# Patient Record
Sex: MALE | Race: WHITE | NOT HISPANIC OR LATINO | Employment: UNEMPLOYED | URBAN - METROPOLITAN AREA
[De-identification: names, ages, dates, MRNs, and addresses within clinical notes are randomized per-mention and may not be internally consistent; named-entity substitution may affect disease eponyms.]

---

## 2023-11-28 ENCOUNTER — NURSING HOME VISIT (OUTPATIENT)
Dept: POST ACUTE CARE | Facility: EXTERNAL LOCATION | Age: 53
End: 2023-11-28
Payer: MEDICAID

## 2023-11-28 DIAGNOSIS — R13.10 DYSPHAGIA, UNSPECIFIED TYPE: ICD-10-CM

## 2023-11-28 DIAGNOSIS — I11.9 HYPERTENSIVE CARDIOMEGALY: ICD-10-CM

## 2023-11-28 DIAGNOSIS — R53.1 WEAKNESS: Primary | ICD-10-CM

## 2023-11-28 DIAGNOSIS — Z79.899 ON DEEP VEIN THROMBOSIS (DVT) PROPHYLAXIS: ICD-10-CM

## 2023-11-28 DIAGNOSIS — F25.9 SCHIZOAFFECTIVE DISORDER, UNSPECIFIED TYPE (MULTI): ICD-10-CM

## 2023-11-28 PROBLEM — F20.9 SCHIZOPHRENIA (MULTI): Status: ACTIVE | Noted: 2023-11-28

## 2023-11-28 PROBLEM — F20.9 SCHIZOPHRENIA (MULTI): Status: RESOLVED | Noted: 2023-11-28 | Resolved: 2023-11-28

## 2023-11-28 PROCEDURE — 99309 SBSQ NF CARE MODERATE MDM 30: CPT | Performed by: NURSE PRACTITIONER

## 2023-11-28 NOTE — LETTER
Patient: Ian Robison  : 1970    Encounter Date: 2023    PROGRESS NOTE    Subjective  Chief complaint: Ian Robison is a 53 y.o. male who is an acute skilled patient being seen and evaluated for weakness    HPI: recently admitted to this facility for rehabilitation following hospitalization for pneumonia. Is actively responding. Poor historian. Asking for his mother top be called to bring him cigarettes.   Requires mechanical lift and is totally dependent on staff for all bed mobility.   HPI      Objective  Vital signs: 108/71-78-98.3-18-95%     Physical Exam  Constitutional:       General: He is not in acute distress.  Eyes:      Extraocular Movements: Extraocular movements intact.   Cardiovascular:      Rate and Rhythm: Normal rate and regular rhythm.   Pulmonary:      Effort: Pulmonary effort is normal.      Breath sounds: Normal breath sounds.      Comments: Lungs clear   Abdominal:      General: Bowel sounds are normal.      Palpations: Abdomen is soft.      Comments: Soft, distended  BS x 4  Has scar from healed previous  peg tube site    Musculoskeletal:      Cervical back: Neck supple.      Right lower leg: No edema.      Left lower leg: No edema.   Neurological:      Mental Status: He is alert.   Psychiatric:         Mood and Affect: Mood normal.         Behavior: Behavior is cooperative.         Assessment/Plan  Problem List Items Addressed This Visit       Weakness - Primary     Requires rehabilitation   Currently requires mechanical lift   Monitor progress         Dysphagia     Requires modified pureed diet   hX peg tube feeding   ST to follow          RESOLVED: Schizoaffective disorder (CMS/HCC)     Chronic history  Had required mental health admission   Poor historian    Remains on haldol , depakene and cogentin   Monitor mood and behaviors    Psych to follow          On deep vein thrombosis (DVT) prophylaxis     Limited mobility   requires lovenox   Monitor symptoms          Hypertensive cardiomegaly     Monitor CV status   Monitor BP    Remains on propranolol           Medications, treatments, and labs reviewed  Continue medications and treatments as listed in EMR    ANUP Jay      Electronically Signed By: ANUP Jay   11/28/23  7:13 PM

## 2023-11-28 NOTE — PROGRESS NOTES
PROGRESS NOTE    Subjective   Chief complaint: Ian Robison is a 53 y.o. male who is an acute skilled patient being seen and evaluated for weakness    HPI: recently admitted to this facility for rehabilitation following hospitalization for pneumonia. Is actively responding. Poor historian. Asking for his mother top be called to bring him cigarettes.   Requires mechanical lift and is totally dependent on staff for all bed mobility.   HPI      Objective   Vital signs: 108/71-78-98.3-18-95%     Physical Exam  Constitutional:       General: He is not in acute distress.  Eyes:      Extraocular Movements: Extraocular movements intact.   Cardiovascular:      Rate and Rhythm: Normal rate and regular rhythm.   Pulmonary:      Effort: Pulmonary effort is normal.      Breath sounds: Normal breath sounds.      Comments: Lungs clear   Abdominal:      General: Bowel sounds are normal.      Palpations: Abdomen is soft.      Comments: Soft, distended  BS x 4  Has scar from healed previous  peg tube site    Musculoskeletal:      Cervical back: Neck supple.      Right lower leg: No edema.      Left lower leg: No edema.   Neurological:      Mental Status: He is alert.   Psychiatric:         Mood and Affect: Mood normal.         Behavior: Behavior is cooperative.         Assessment/Plan   Problem List Items Addressed This Visit       Weakness - Primary     Requires rehabilitation   Currently requires mechanical lift   Monitor progress         Dysphagia     Requires modified pureed diet   hX peg tube feeding   ST to follow          RESOLVED: Schizoaffective disorder (CMS/HCC)     Chronic history  Had required mental health admission   Poor historian    Remains on haldol , depakene and cogentin   Monitor mood and behaviors    Psych to follow          On deep vein thrombosis (DVT) prophylaxis     Limited mobility   requires lovenox   Monitor symptoms         Hypertensive cardiomegaly     Monitor CV status   Monitor BP    Remains on  propranolol           Medications, treatments, and labs reviewed  Continue medications and treatments as listed in EMR    Bruna Hanson, APRN-CNP

## 2023-12-01 ENCOUNTER — LAB REQUISITION (OUTPATIENT)
Dept: LAB | Facility: HOSPITAL | Age: 53
End: 2023-12-01
Payer: MEDICAID

## 2023-12-01 LAB
APPEARANCE UR: ABNORMAL
BILIRUB UR STRIP.AUTO-MCNC: NEGATIVE MG/DL
COLOR UR: YELLOW
GLUCOSE UR STRIP.AUTO-MCNC: NEGATIVE MG/DL
HOLD SPECIMEN: NORMAL
KETONES UR STRIP.AUTO-MCNC: ABNORMAL MG/DL
LEUKOCYTE ESTERASE UR QL STRIP.AUTO: NEGATIVE
NITRITE UR QL STRIP.AUTO: NEGATIVE
PH UR STRIP.AUTO: 6 [PH]
PROT UR STRIP.AUTO-MCNC: NEGATIVE MG/DL
RBC # UR STRIP.AUTO: NEGATIVE /UL
SP GR UR STRIP.AUTO: 1.01
UROBILINOGEN UR STRIP.AUTO-MCNC: 2 MG/DL

## 2023-12-01 PROCEDURE — 81003 URINALYSIS AUTO W/O SCOPE: CPT

## 2023-12-04 ENCOUNTER — NURSING HOME VISIT (OUTPATIENT)
Dept: POST ACUTE CARE | Facility: EXTERNAL LOCATION | Age: 53
End: 2023-12-04
Payer: MEDICAID

## 2023-12-04 DIAGNOSIS — K59.00 CONSTIPATION, UNSPECIFIED CONSTIPATION TYPE: ICD-10-CM

## 2023-12-04 DIAGNOSIS — F25.9 SCHIZOAFFECTIVE DISORDER, UNSPECIFIED TYPE (MULTI): Primary | ICD-10-CM

## 2023-12-04 DIAGNOSIS — G93.40 ENCEPHALOPATHY: ICD-10-CM

## 2023-12-04 DIAGNOSIS — R53.1 WEAKNESS: ICD-10-CM

## 2023-12-04 PROCEDURE — 99305 1ST NF CARE MODERATE MDM 35: CPT | Performed by: INTERNAL MEDICINE

## 2023-12-04 NOTE — ASSESSMENT & PLAN NOTE
Treated in hospital  NG tube for decompression  Monitor for recurrent symptoms  Continue bowel regime

## 2023-12-04 NOTE — PROGRESS NOTES
HISTORY & PHYSICAL    Subjective   Chief complaint: Ian Robison is a 53 y.o. male who is being seen and evaluated for multiple medical problems.  Patient admitted to SNF for therapy due to weakness after recent hospitalization.    HPI:  HPI  Patient presented to ED by EMS from [facility] for mental status changes on 11\6.  ED work-up notable for CT chest showing findings suggestive of multifocal pneumonia concerning for aspiration.  CT head showed ventriculomegaly.  Patient treated with ATVs.  Neurology consulted on patient with 24-hour EEG that showed generalized slowing and was without epileptiform activity.  Patient was found to have abdominal distention.  KUB revealed colonic distention and unable to exclude ileus or obstruction.  CT A/P showed copious colonic stool.  NG tube was placed for decompression.  Patient tolerating oral intake and good stool output.  Therapy evaluated patient recommended therapy.  Patient is deemed stable for discharge to skilled nursing facility for continued medical management and therapy.    Past Medical History:   Diagnosis Date    Constipation     Dysphagia     Schizoaffective disorder (CMS/HCC)        Past Surgical History:   Procedure Laterality Date    OTHER SURGICAL HISTORY  05/09/2013    Amputation Of Middle Finger, With Neurectomy (Each)       Family History   Problem Relation Name Age of Onset    No Known Problems Mother      No Known Problems Father         Social History     Socioeconomic History    Marital status: Single     Spouse name: Not on file    Number of children: Not on file    Years of education: Not on file    Highest education level: Not on file   Occupational History    Not on file   Tobacco Use    Smoking status: Not on file    Smokeless tobacco: Not on file   Substance and Sexual Activity    Alcohol use: Not on file    Drug use: Not on file    Sexual activity: Not on file   Other Topics Concern    Not on file   Social History Narrative     Not on file     Social Determinants of Health     Financial Resource Strain: Not on file   Food Insecurity: Not on file   Transportation Needs: Not on file   Physical Activity: Not on file   Stress: Not on file   Social Connections: Not on file   Intimate Partner Violence: Not on file   Housing Stability: Not on file       Vital signs: 108/71, 97.6, 76, 18, 90%    Objective   Physical Exam  Constitutional:       General: He is not in acute distress.  Eyes:      Extraocular Movements: Extraocular movements intact.   Cardiovascular:      Rate and Rhythm: Normal rate and regular rhythm.   Pulmonary:      Effort: Pulmonary effort is normal.      Breath sounds: Normal breath sounds.   Abdominal:      General: Bowel sounds are normal.      Palpations: Abdomen is soft.   Musculoskeletal:      Cervical back: Neck supple.      Right lower leg: No edema.      Left lower leg: No edema.   Neurological:      Mental Status: He is alert.   Psychiatric:         Mood and Affect: Mood normal.         Behavior: Behavior is cooperative.         Assessment/Plan   Problem List Items Addressed This Visit       Weakness     Therapy to evaluate         Schizoaffective disorder (CMS/HCC) - Primary     Monitor  Haloperidol  Benztropine           Constipation     Treated in hospital  NG tube for decompression  Monitor for recurrent symptoms  Continue bowel regime         Encephalopathy     Resolved in hospital, likely 2/2 clozapine toxicity, polypharmacy and delirium.           Hospital records reviewed  Medications, treatments, and labs reviewed  Continue medications and treatments as listed in EMR  Discussed with nursing and therapy      Scribe Attestation  I, Aidan Jacobo   attest that this documentation has been prepared under the direction and in the presence of Shazia Demarco MD    Provider Attestation - Scribe documentation  All medical record entries made by the Scribe were at my direction and personally dictated by me. I  have reviewed the chart and agree that the record accurately reflects my personal performance of the history, physical exam, discussion and plan.   Shazia Demarco MD

## 2023-12-04 NOTE — LETTER
Patient: Ian Robison  : 1970    Encounter Date: 2023    HISTORY & PHYSICAL    Subjective  Chief complaint: Ian Robison is a 53 y.o. male who is being seen and evaluated for multiple medical problems.  Patient admitted to SNF for therapy due to weakness after recent hospitalization.    HPI:  HPI  Patient presented to ED by EMS from [facility] for mental status changes on 11\6.  ED work-up notable for CT chest showing findings suggestive of multifocal pneumonia concerning for aspiration.  CT head showed ventriculomegaly.  Patient treated with ATVs.  Neurology consulted on patient with 24-hour EEG that showed generalized slowing and was without epileptiform activity.  Patient was found to have abdominal distention.  KUB revealed colonic distention and unable to exclude ileus or obstruction.  CT A/P showed copious colonic stool.  NG tube was placed for decompression.  Patient tolerating oral intake and good stool output.  Therapy evaluated patient recommended therapy.  Patient is deemed stable for discharge to skilled nursing facility for continued medical management and therapy.    Past Medical History:   Diagnosis Date   • Constipation    • Dysphagia    • Schizoaffective disorder (CMS/HCC)        Past Surgical History:   Procedure Laterality Date   • OTHER SURGICAL HISTORY  2013    Amputation Of Middle Finger, With Neurectomy (Each)       Family History   Problem Relation Name Age of Onset   • No Known Problems Mother     • No Known Problems Father         Social History     Socioeconomic History   • Marital status: Single     Spouse name: Not on file   • Number of children: Not on file   • Years of education: Not on file   • Highest education level: Not on file   Occupational History   • Not on file   Tobacco Use   • Smoking status: Not on file   • Smokeless tobacco: Not on file   Substance and Sexual Activity   • Alcohol use: Not on file   • Drug use: Not on file   • Sexual  activity: Not on file   Other Topics Concern   • Not on file   Social History Narrative   • Not on file     Social Determinants of Health     Financial Resource Strain: Not on file   Food Insecurity: Not on file   Transportation Needs: Not on file   Physical Activity: Not on file   Stress: Not on file   Social Connections: Not on file   Intimate Partner Violence: Not on file   Housing Stability: Not on file       Vital signs: 108/71, 97.6, 76, 18, 90%    Objective  Physical Exam  Constitutional:       General: He is not in acute distress.  Eyes:      Extraocular Movements: Extraocular movements intact.   Cardiovascular:      Rate and Rhythm: Normal rate and regular rhythm.   Pulmonary:      Effort: Pulmonary effort is normal.      Breath sounds: Normal breath sounds.   Abdominal:      General: Bowel sounds are normal.      Palpations: Abdomen is soft.   Musculoskeletal:      Cervical back: Neck supple.      Right lower leg: No edema.      Left lower leg: No edema.   Neurological:      Mental Status: He is alert.   Psychiatric:         Mood and Affect: Mood normal.         Behavior: Behavior is cooperative.         Assessment/Plan  Problem List Items Addressed This Visit       Weakness     Therapy to evaluate         Schizoaffective disorder (CMS/[facility]) - Primary     Monitor  Haloperidol  Benztropine           Constipation     Treated in hospital  NG tube for decompression  Monitor for recurrent symptoms  Continue bowel regime         Encephalopathy     Resolved in hospital, likely 2/2 clozapine toxicity, polypharmacy and delirium.           Hospital records reviewed  Medications, treatments, and labs reviewed  Continue medications and treatments as listed in EMR  Discussed with nursing and therapy      Scribe Attestation  I, Aidan Jacobo   attest that this documentation has been prepared under the direction and in the presence of Shazia Demarco MD    Provider Attestation - Scribe documentation  All medical  record entries made by the Scribe were at my direction and personally dictated by me. I have reviewed the chart and agree that the record accurately reflects my personal performance of the history, physical exam, discussion and plan.   Shazia Demarco MD      Electronically Signed By: Shazia Demarco MD   12/4/23  2:15 PM

## 2023-12-11 ENCOUNTER — NURSING HOME VISIT (OUTPATIENT)
Dept: POST ACUTE CARE | Facility: EXTERNAL LOCATION | Age: 53
End: 2023-12-11
Payer: MEDICAID

## 2023-12-11 DIAGNOSIS — R53.1 WEAKNESS: ICD-10-CM

## 2023-12-11 DIAGNOSIS — F25.9 SCHIZOAFFECTIVE DISORDER, UNSPECIFIED TYPE (MULTI): ICD-10-CM

## 2023-12-11 DIAGNOSIS — I11.9 HYPERTENSIVE CARDIOMEGALY: ICD-10-CM

## 2023-12-11 PROCEDURE — 99309 SBSQ NF CARE MODERATE MDM 30: CPT | Performed by: INTERNAL MEDICINE
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
## 2023-12-18 ENCOUNTER — NURSING HOME VISIT (OUTPATIENT)
Dept: POST ACUTE CARE | Facility: EXTERNAL LOCATION | Age: 53
End: 2023-12-18
Payer: MEDICAID

## 2023-12-18 DIAGNOSIS — R53.1 WEAKNESS: ICD-10-CM

## 2023-12-18 DIAGNOSIS — F25.9 SCHIZOAFFECTIVE DISORDER, UNSPECIFIED TYPE (MULTI): ICD-10-CM

## 2023-12-18 DIAGNOSIS — I11.9 HYPERTENSIVE CARDIOMEGALY: ICD-10-CM

## 2023-12-18 PROCEDURE — 99308 SBSQ NF CARE LOW MDM 20: CPT | Performed by: INTERNAL MEDICINE

## 2023-12-18 NOTE — PROGRESS NOTES
PROGRESS NOTE    Subjective   Chief complaint: Ian Robison is a 53 y.o. male who is a long term care patient being seen and evaluated for weakness.     HPI:  Patient has been working with therapy to reach goals. He is able to ambulate 44' and 75' with FWW and min assist with instruction in upright ambulatory posture. Nursing has no new concerns.       Objective   Vital signs: 123/73,91,98%    Physical Exam  Constitutional:       General: He is not in acute distress.  Eyes:      Extraocular Movements: Extraocular movements intact.   Cardiovascular:      Rate and Rhythm: Normal rate and regular rhythm.   Pulmonary:      Effort: Pulmonary effort is normal.      Breath sounds: Normal breath sounds.   Abdominal:      General: Bowel sounds are normal.      Palpations: Abdomen is soft.   Musculoskeletal:      Cervical back: Neck supple.      Right lower leg: No edema.      Left lower leg: No edema.   Neurological:      Mental Status: He is alert.   Psychiatric:         Mood and Affect: Mood normal.         Behavior: Behavior is cooperative.         Assessment/Plan   Problem List Items Addressed This Visit       Weakness     Continue therapy         Schizoaffective disorder (CMS/[facility])     Monitor  Haloperidol  Benztropine           Hypertensive cardiomegaly     Monitor CV status   Monitor BP    Remains on propranolol           Medications, treatments, and labs reviewed  Continue medications and treatments as listed in EMR      Scribe Attestation  IMarie Scribe   attest that this documentation has been prepared under the direction and in the presence of Shazia Demarco MD.     Provider Attestation - Scribe documentation  All medical record entries made by the Scribe were at my direction and personally dictated by me. I have reviewed the chart and agree that the record accurately reflects my personal performance of the history, physical exam, discussion and plan.   Shazia Demarco MD

## 2023-12-18 NOTE — LETTER
Patient: Ian Robison  : 1970    Encounter Date: 2023    PROGRESS NOTE    Subjective  Chief complaint: Ian Robison is a 53 y.o. male who is a long term care patient being seen and evaluated for weakness.     HPI:  Patient has been working with therapy to reach goals. He is able to ambulate 44' and 75' with FWW and min assist with instruction in upright ambulatory posture. Nursing has no new concerns.       Objective  Vital signs: 123/73,91,98%    Physical Exam  Constitutional:       General: He is not in acute distress.  Eyes:      Extraocular Movements: Extraocular movements intact.   Cardiovascular:      Rate and Rhythm: Normal rate and regular rhythm.   Pulmonary:      Effort: Pulmonary effort is normal.      Breath sounds: Normal breath sounds.   Abdominal:      General: Bowel sounds are normal.      Palpations: Abdomen is soft.   Musculoskeletal:      Cervical back: Neck supple.      Right lower leg: No edema.      Left lower leg: No edema.   Neurological:      Mental Status: He is alert.   Psychiatric:         Mood and Affect: Mood normal.         Behavior: Behavior is cooperative.         Assessment/Plan  Problem List Items Addressed This Visit       Weakness     Continue therapy         Schizoaffective disorder (CMS/HCC)     Monitor  Haloperidol  Benztropine           Hypertensive cardiomegaly     Monitor CV status   Monitor BP    Remains on propranolol           Medications, treatments, and labs reviewed  Continue medications and treatments as listed in EMR      Scribe Attestation  IMarie Scribe   attest that this documentation has been prepared under the direction and in the presence of Shazia Demarco MD.     Provider Attestation - Scribe documentation  All medical record entries made by the Scribe were at my direction and personally dictated by me. I have reviewed the chart and agree that the record accurately reflects my personal performance of the history,  physical exam, discussion and plan.   Shazia Demarco MD      Electronically Signed By: Shazia Demarco MD   12/18/23  5:37 PM

## 2023-12-19 ENCOUNTER — NURSING HOME VISIT (OUTPATIENT)
Dept: POST ACUTE CARE | Facility: EXTERNAL LOCATION | Age: 53
End: 2023-12-19
Payer: MEDICAID

## 2023-12-19 DIAGNOSIS — F25.9 SCHIZOAFFECTIVE DISORDER, UNSPECIFIED TYPE (MULTI): Primary | ICD-10-CM

## 2023-12-19 DIAGNOSIS — Z79.899 ON DEEP VEIN THROMBOSIS (DVT) PROPHYLAXIS: ICD-10-CM

## 2023-12-19 PROCEDURE — 99309 SBSQ NF CARE MODERATE MDM 30: CPT | Performed by: NURSE PRACTITIONER
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
## 2024-03-22 ENCOUNTER — HOSPITAL ENCOUNTER (EMERGENCY)
Facility: HOSPITAL | Age: 54
Discharge: OTHER INSTITUTION | End: 2024-03-23
Attending: EMERGENCY MEDICINE
Payer: MEDICAID

## 2024-03-22 ENCOUNTER — APPOINTMENT (OUTPATIENT)
Dept: RADIOLOGY | Facility: HOSPITAL | Age: 54
End: 2024-03-22
Payer: MEDICAID

## 2024-03-22 VITALS
RESPIRATION RATE: 18 BRPM | OXYGEN SATURATION: 100 % | HEART RATE: 84 BPM | SYSTOLIC BLOOD PRESSURE: 140 MMHG | TEMPERATURE: 98.6 F | DIASTOLIC BLOOD PRESSURE: 80 MMHG

## 2024-03-22 DIAGNOSIS — S02.2XXB OPEN FRACTURE OF NASAL BONE, INITIAL ENCOUNTER: Primary | ICD-10-CM

## 2024-03-22 PROCEDURE — 99285 EMERGENCY DEPT VISIT HI MDM: CPT | Mod: 25

## 2024-03-22 PROCEDURE — 70486 CT MAXILLOFACIAL W/O DYE: CPT | Performed by: SURGERY

## 2024-03-22 PROCEDURE — 70450 CT HEAD/BRAIN W/O DYE: CPT | Performed by: SURGERY

## 2024-03-22 PROCEDURE — 70450 CT HEAD/BRAIN W/O DYE: CPT

## 2024-03-22 PROCEDURE — 70486 CT MAXILLOFACIAL W/O DYE: CPT

## 2024-03-22 PROCEDURE — 99285 EMERGENCY DEPT VISIT HI MDM: CPT | Performed by: EMERGENCY MEDICINE

## 2024-03-22 PROCEDURE — 76376 3D RENDER W/INTRP POSTPROCES: CPT

## 2024-03-22 RX ORDER — OXYMETAZOLINE HCL 0.05 %
2 SPRAY, NON-AEROSOL (ML) NASAL EVERY 12 HOURS PRN
Qty: 30 ML | Refills: 0 | Status: SHIPPED | OUTPATIENT
Start: 2024-03-22 | End: 2024-03-25

## 2024-03-22 RX ORDER — CIPROFLOXACIN 500 MG/1
500 TABLET ORAL 2 TIMES DAILY
Qty: 14 TABLET | Refills: 0 | Status: SHIPPED | OUTPATIENT
Start: 2024-03-22 | End: 2024-03-29

## 2024-03-22 ASSESSMENT — LIFESTYLE VARIABLES
HAVE PEOPLE ANNOYED YOU BY CRITICIZING YOUR DRINKING: NO
TOTAL SCORE: 0
HAVE YOU EVER FELT YOU SHOULD CUT DOWN ON YOUR DRINKING: NO
EVER FELT BAD OR GUILTY ABOUT YOUR DRINKING: NO
EVER HAD A DRINK FIRST THING IN THE MORNING TO STEADY YOUR NERVES TO GET RID OF A HANGOVER: NO

## 2024-03-22 ASSESSMENT — COLUMBIA-SUICIDE SEVERITY RATING SCALE - C-SSRS
6. HAVE YOU EVER DONE ANYTHING, STARTED TO DO ANYTHING, OR PREPARED TO DO ANYTHING TO END YOUR LIFE?: NO
2. HAVE YOU ACTUALLY HAD ANY THOUGHTS OF KILLING YOURSELF?: NO
1. IN THE PAST MONTH, HAVE YOU WISHED YOU WERE DEAD OR WISHED YOU COULD GO TO SLEEP AND NOT WAKE UP?: NO

## 2024-03-22 ASSESSMENT — PAIN SCALES - GENERAL: PAINLEVEL_OUTOF10: 0 - NO PAIN

## 2024-03-22 ASSESSMENT — PAIN - FUNCTIONAL ASSESSMENT: PAIN_FUNCTIONAL_ASSESSMENT: 0-10
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
## 2024-03-23 NOTE — DISCHARGE INSTRUCTIONS
For the next 2 weeks: Please avoid blowing your nose, drinking through a straw, or sneezing.  Please do not smoke, forcibly spit, or perform strenuous exercise.  Please take Afrin as needed for congestion.

## 2024-04-02 ENCOUNTER — OFFICE VISIT (OUTPATIENT)
Dept: OTOLARYNGOLOGY | Facility: CLINIC | Age: 54
End: 2024-04-02
Payer: MEDICAID

## 2024-04-02 DIAGNOSIS — J34.2 NASAL SEPTAL DEVIATION: ICD-10-CM

## 2024-04-02 DIAGNOSIS — S02.2XXD CLOSED FRACTURE OF NASAL BONE WITH ROUTINE HEALING, SUBSEQUENT ENCOUNTER: Primary | ICD-10-CM

## 2024-04-02 PROCEDURE — 99203 OFFICE O/P NEW LOW 30 MIN: CPT | Performed by: NURSE PRACTITIONER

## 2024-04-02 RX ORDER — HYDROXYZINE HYDROCHLORIDE 50 MG/1
TABLET, FILM COATED ORAL
COMMUNITY
Start: 2024-01-22

## 2024-04-02 RX ORDER — CLOZAPINE 25 MG/1
TABLET ORAL
COMMUNITY
Start: 2024-04-01

## 2024-04-02 RX ORDER — FUROSEMIDE 40 MG/1
TABLET ORAL
COMMUNITY
Start: 2017-07-13

## 2024-04-02 RX ORDER — ACETAMINOPHEN 500 MG
TABLET ORAL
COMMUNITY
Start: 2024-03-13

## 2024-04-02 RX ORDER — ACETAMINOPHEN 325 MG/1
650 TABLET ORAL
COMMUNITY

## 2024-04-02 RX ORDER — LORATADINE 10 MG/1
TABLET ORAL
COMMUNITY
Start: 2015-08-06

## 2024-04-02 RX ORDER — CLOTRIMAZOLE 1 %
CREAM (GRAM) TOPICAL
COMMUNITY
Start: 2023-05-04

## 2024-04-02 RX ORDER — PROPRANOLOL HYDROCHLORIDE 10 MG/1
TABLET ORAL
COMMUNITY
Start: 2022-11-20

## 2024-04-02 RX ORDER — DIPHENHYDRAMINE HCL 50 MG
50 CAPSULE ORAL
COMMUNITY

## 2024-04-02 RX ORDER — POLYETHYLENE GLYCOL 3350 17 G/17G
17 POWDER, FOR SOLUTION ORAL 2 TIMES DAILY
COMMUNITY
Start: 2023-11-28

## 2024-04-02 RX ORDER — POTASSIUM CHLORIDE 20 MEQ/1
TABLET, EXTENDED RELEASE ORAL
COMMUNITY
Start: 2018-02-20

## 2024-04-02 RX ORDER — BENZTROPINE MESYLATE 0.5 MG/1
0.5 TABLET ORAL 2 TIMES DAILY
COMMUNITY
Start: 2023-11-28

## 2024-04-02 RX ORDER — NEOMYCIN/BACITRACIN/POLYMYXINB 3.5-400-5K
1 OINTMENT (GRAM) TOPICAL NIGHTLY
COMMUNITY
Start: 2023-11-28

## 2024-04-02 RX ORDER — ADHESIVE BANDAGE
30 BANDAGE TOPICAL EVERY 6 HOURS
COMMUNITY

## 2024-04-02 RX ORDER — LANOLIN ALCOHOL/MO/W.PET/CERES
1000 CREAM (GRAM) TOPICAL
COMMUNITY

## 2024-04-02 ASSESSMENT — PATIENT HEALTH QUESTIONNAIRE - PHQ9
SUM OF ALL RESPONSES TO PHQ9 QUESTIONS 1 AND 2: 0
2. FEELING DOWN, DEPRESSED OR HOPELESS: NOT AT ALL
1. LITTLE INTEREST OR PLEASURE IN DOING THINGS: NOT AT ALL
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
## 2024-04-02 NOTE — PATIENT INSTRUCTIONS
Today you were evaluated by Halle Ramirez CNP.    Please follow-up in 2 months or sooner if needed. If you have any questions or concerns, please contact my office at [phone].     Avoid any further injury to the nose. Please call me with any problems or concerns.

## 2024-04-24 NOTE — ASSESSMENT & PLAN NOTE
Chronic history  Had required mental health admission   Poor historian    Remains on haldol , depakene and cogentin   Monitor mood and behaviors    Psych to follow    home

## 2024-05-17 ENCOUNTER — TELEPHONE (OUTPATIENT)
Dept: HEMATOLOGY/ONCOLOGY | Facility: HOSPITAL | Age: 54
End: 2024-05-17
Payer: MEDICAID

## 2024-05-17 NOTE — TELEPHONE ENCOUNTER
RN called patient's number and a nursing home answered. RN left a message with the scheduling desk to call back to confirm patient's appointment. Call back instructions reviewed.

## 2024-05-20 ENCOUNTER — TELEPHONE (OUTPATIENT)
Dept: HEMATOLOGY/ONCOLOGY | Facility: HOSPITAL | Age: 54
End: 2024-05-20
Payer: MEDICAID

## 2024-05-20 NOTE — TELEPHONE ENCOUNTER
RN called patient's number and this patient lives at [facility]. The first person I talked to did not seem to know about this appointment. RN called back and was transferred and not answer.

## 2024-05-23 NOTE — TELEPHONE ENCOUNTER
Patient did not show for appointment today. RN talked to Trung at [facility] and she said that patient missed his transport for the appointment for today. RN gave them the scheduling umber for [facility].